# Patient Record
Sex: FEMALE | Race: OTHER
[De-identification: names, ages, dates, MRNs, and addresses within clinical notes are randomized per-mention and may not be internally consistent; named-entity substitution may affect disease eponyms.]

---

## 2019-04-18 ENCOUNTER — HOSPITAL ENCOUNTER (OUTPATIENT)
Dept: HOSPITAL 89 - MRI | Age: 24
End: 2019-04-18
Attending: NURSE PRACTITIONER
Payer: COMMERCIAL

## 2019-04-18 DIAGNOSIS — N64.52: Primary | ICD-10-CM

## 2019-04-18 PROCEDURE — 70553 MRI BRAIN STEM W/O & W/DYE: CPT

## 2019-04-18 NOTE — RADIOLOGY IMAGING REPORT
FACILITY: [facility] [place] 

 

PATIENT NAME: Daina Lombardo

: 1995

MR: [id number]

V: [id number]

EXAM DATE: 

ORDERING PHYSICIAN: RIVKA WHATLEY

TECHNOLOGIST: 

 

Location: [facility]

Patient: Daina Lombardo

: 1995

MRN: [id number]

Visit/Account:[id number]

Date of Sevice:  2019

 

ACCESSION #: [id number]

 

Examination: Pituitary MR without and with contrast

 

History: Nipple discharge

 

Comparison: None

 

Technique: Multiplane pre and postcontrast imaging performed through the pituitary region.  10 mL Mul
tiHance injected.  Axial flair and axial diffusion sequences obtained through the brain.

 

FINDINGS:

 

No diffusion restriction, hydrocephalus or midline shift.  Normal parenchymal signal.

 

Normal optic chiasm.  Midline and normal infundibulum.  Normal cavernous sinuses.  Normal sized pitui
tary gland exhibits uniform enhancement without apparent microadenoma.

 

No apparent suprasellar abnormality.

 

IMPRESSION:.

 

Normal pituitary MR without and with contrast.  No abnormality seen to explain the nipple discharge.

 

Report Dictated By: Tyrone Doshi MD at 2019 12:08 PM

 

Report E-Signed By: Tyrone Doshi MD  at 2019 12:15 PM

 

WSN:[id number]